# Patient Record
Sex: MALE | Race: BLACK OR AFRICAN AMERICAN | URBAN - METROPOLITAN AREA
[De-identification: names, ages, dates, MRNs, and addresses within clinical notes are randomized per-mention and may not be internally consistent; named-entity substitution may affect disease eponyms.]

---

## 2019-07-15 ENCOUNTER — OFFICE VISIT (OUTPATIENT)
Dept: INTERNAL MEDICINE CLINIC | Age: 39
End: 2019-07-15

## 2019-07-15 VITALS
DIASTOLIC BLOOD PRESSURE: 78 MMHG | HEART RATE: 57 BPM | WEIGHT: 211.8 LBS | RESPIRATION RATE: 20 BRPM | HEIGHT: 72 IN | OXYGEN SATURATION: 98 % | TEMPERATURE: 98.6 F | BODY MASS INDEX: 28.69 KG/M2 | SYSTOLIC BLOOD PRESSURE: 121 MMHG

## 2019-07-15 DIAGNOSIS — N34.2 URETHRITIS: Primary | ICD-10-CM

## 2019-07-15 RX ORDER — DOXYCYCLINE 100 MG/1
100 TABLET ORAL 2 TIMES DAILY
Qty: 14 TAB | Refills: 0 | Status: SHIPPED | OUTPATIENT
Start: 2019-07-15 | End: 2019-07-22

## 2019-07-15 RX ORDER — METRONIDAZOLE 500 MG/1
TABLET ORAL
Qty: 4 TAB | Refills: 0 | Status: SHIPPED | OUTPATIENT
Start: 2019-07-15

## 2019-07-15 NOTE — PROGRESS NOTES
Chief Complaint   Patient presents with    Well Male     1. Have you been to the ER, urgent care clinic since your last visit? Hospitalized since your last visit? No    2. Have you seen or consulted any other health care providers outside of the [facility] since your last visit? Include any pap smears or colon screening.  No

## 2019-07-15 NOTE — PROGRESS NOTES
[facility]  [address] [place] [address]  Phone:  [phone]  Fax: [phone]           Chief Complaint   Patient presents with    Well Male   . SUBJECTIVE:    Maria Alejandra Ambrocio is a 42 y.o. male Complains of having sexual contact with a young lady who had an STD. He could not give me any more details. Unfortunately this occurred one month ago, but this was the earliest he could come to the office. He has no symptoms currently other than mild dysuria. Current Outpatient Medications   Medication Sig Dispense Refill    doxycycline (ADOXA) 100 mg tablet Take 1 Tab by mouth two (2) times a day for 7 days. 14 Tab 0    metroNIDAZOLE (FLAGYL) 500 mg tablet 1 tablet every 5 minutes x 4 4 Tab 0     Past Medical History:   Diagnosis Date    Cigarette smoker     Toe pain, right      History reviewed. No pertinent surgical history. No Known Allergies      OBJECTIVE:  Visit Vitals  /78   Pulse (!) 57   Temp 98.6 °F (37 °C) (Oral)   Resp 20   Ht 6' (1.829 m)   Wt 211 lb 12.8 oz (96.1 kg)   SpO2 98%   BMI 28.73 kg/m²     ENT: perrla,  eom intact  NECK: supple. Thyroid normal  CHEST: clear to ascultation and percussion   HEART: regular rate and rhythm    Assessment:  1. Urethritis        Plan:    1. The patient has a urethritis and will be empirically treated with Doxycycline 100 mg b.i.d. for seven days and Metronidazole 2,000 mg x one. Culture has been obtained. He is told to avoid sexual contact for at least a week. .  Orders Placed This Encounter    CHLAMYDIA / GC-AMPLIFIED    doxycycline (ADOXA) 100 mg tablet    metroNIDAZOLE (FLAGYL) 500 mg tablet       Follow-up and Dispositions    · Return if symptoms worsen or fail to improve.            Maria A Rodriguez MD

## 2019-07-19 LAB
C TRACH RRNA CVX QL NAA+PROBE: NEGATIVE
N GONORRHOEA RRNA CVX QL NAA+PROBE: NEGATIVE